# Patient Record
Sex: MALE | Race: OTHER | HISPANIC OR LATINO | URBAN - METROPOLITAN AREA
[De-identification: names, ages, dates, MRNs, and addresses within clinical notes are randomized per-mention and may not be internally consistent; named-entity substitution may affect disease eponyms.]

---

## 2017-01-24 ENCOUNTER — EMERGENCY (EMERGENCY)
Facility: HOSPITAL | Age: 27
LOS: 1 days | Discharge: HOME | End: 2017-01-24
Attending: EMERGENCY MEDICINE
Payer: MEDICAID

## 2017-01-24 VITALS
DIASTOLIC BLOOD PRESSURE: 66 MMHG | HEART RATE: 77 BPM | RESPIRATION RATE: 17 BRPM | OXYGEN SATURATION: 99 % | HEIGHT: 63 IN | TEMPERATURE: 98 F | WEIGHT: 154.98 LBS | SYSTOLIC BLOOD PRESSURE: 142 MMHG
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
## 2017-01-24 DIAGNOSIS — S01.81XA LACERATION WITHOUT FOREIGN BODY OF OTHER PART OF HEAD, INITIAL ENCOUNTER: ICD-10-CM
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
## 2017-01-24 PROCEDURE — 90715 TDAP VACCINE 7 YRS/> IM: CPT

## 2017-01-24 PROCEDURE — 12011 RPR F/E/E/N/L/M 2.5 CM/<: CPT

## 2017-01-24 PROCEDURE — 90471 IMMUNIZATION ADMIN: CPT

## 2017-01-24 PROCEDURE — 99283 EMERGENCY DEPT VISIT LOW MDM: CPT | Mod: 25

## 2017-01-24 RX ORDER — TETANUS TOXOID, REDUCED DIPHTHERIA TOXOID AND ACELLULAR PERTUSSIS VACCINE, ADSORBED 5; 2.5; 8; 8; 2.5 [IU]/.5ML; [IU]/.5ML; UG/.5ML; UG/.5ML; UG/.5ML
0.5 SUSPENSION INTRAMUSCULAR ONCE
Qty: 0 | Refills: 0 | Status: COMPLETED | OUTPATIENT
Start: 2017-01-24 | End: 2017-01-24

## 2017-01-24 RX ADMIN — TETANUS TOXOID, REDUCED DIPHTHERIA TOXOID AND ACELLULAR PERTUSSIS VACCINE, ADSORBED 0.5 MILLILITER(S): 5; 2.5; 8; 8; 2.5 SUSPENSION INTRAMUSCULAR at 19:08

## 2017-01-24 NOTE — ED PROVIDER NOTE - MUSCULOSKELETAL, MLM
Spine appears normal, range of motion is not limited, no muscle or joint tenderness. no c-spine tenderness

## 2017-01-24 NOTE — ED PROVIDER NOTE - OBJECTIVE STATEMENT
27 y/o M w/ no significant PMHx p/w lac lateral to L eye onset today, 2 hrs ago. Pt reports falling in shower w/ mild bleeding at site, resolved. Pt denies LOC, HA, fever, chills, or any other complaint. NKDA.

## 2017-01-24 NOTE — ED PROCEDURE NOTE - CPROC ED LACER REPAIR DETAIL1
The wound was explored to base in bloodless field. No foreign body/The wound was explored to base in bloodless field.

## 2017-01-24 NOTE — ED PROVIDER NOTE - NS ED MD SCRIBE ATTENDING SCRIBE SECTIONS
VITAL SIGNS( Pullset)/HIV/HISTORY OF PRESENT ILLNESS/PAST MEDICAL/SURGICAL/SOCIAL HISTORY/REVIEW OF SYSTEMS/PHYSICAL EXAM/DISPOSITION

## 2017-01-24 NOTE — ED PROVIDER NOTE - MEDICAL DECISION MAKING DETAILS
Patient with facial laceration, repaired. Instructed in possibility of scar and ways to minimize scarring. Also closed head injury. No evidence of ICH. To f/u with PMD in 1-2 days. Return to the ED immediately if getting worse, not improving, or if having any new or troubling symptoms.

## 2017-01-24 NOTE — ED PROCEDURE NOTE - CPROC ED POST PROC CARE GUIDE1
Verbal/written post procedure instructions were given to patient/caregiver. Instructed patient/caregiver regarding signs and symptoms of infection./Keep the cast/splint/dressing clean and dry./Instructed patient/caregiver to follow-up with primary care physician./Verbal/written post procedure instructions were given to patient/caregiver.

## 2017-01-24 NOTE — ED PROVIDER NOTE - CONDUCTED A DETAILED DISCUSSION WITH PATIENT AND/OR GUARDIAN REGARDING, MDM
return to ED if symptoms worsen, persist or questions arise need for outpatient follow-up/return to ED if symptoms worsen, persist or questions arise

## 2022-09-23 NOTE — ED ADULT NURSE NOTE - FINAL NURSING ELECTRONIC SIGNATURE
----- Message from Gomez Sebastian sent at 9/23/2022  8:23 AM CDT -----  Type: Needs Medical Advice  Who Called:  Wife/ Benita Gibson       Best Call Back Number: [phone]  Additional Information: Caller states that she would like a callback regarding questions about the patient's antibiotics.         24-Jan-2017 19:29

## 2024-05-08 NOTE — ED PROCEDURE NOTE - NS ED PROC PERFORMED BY1 FT
Lisinopril 5mg      Last Written Prescription Date:  11/12/23  Last Fill Quantity: 90,   # refills: 1  Last Office Visit : 05/24/23  Future Office visit:  None w/ FP    Routing refill request to provider for review/approval because:   Medication indicated for associated diagnosis    Has GFR on file in past 12 months and most recent value is normal    Normal serum creatinine on file in past 12 months        Diane Fraser)